# Patient Record
Sex: FEMALE | Race: BLACK OR AFRICAN AMERICAN
[De-identification: names, ages, dates, MRNs, and addresses within clinical notes are randomized per-mention and may not be internally consistent; named-entity substitution may affect disease eponyms.]

---

## 2023-04-17 PROBLEM — Z00.00 ENCOUNTER FOR PREVENTIVE HEALTH EXAMINATION: Status: ACTIVE | Noted: 2023-04-17

## 2023-04-20 ENCOUNTER — TRANSCRIPTION ENCOUNTER (OUTPATIENT)
Age: 28
End: 2023-04-20

## 2023-04-20 ENCOUNTER — APPOINTMENT (OUTPATIENT)
Dept: SURGERY | Facility: CLINIC | Age: 28
End: 2023-04-20
Payer: MEDICAID

## 2023-04-20 ENCOUNTER — LABORATORY RESULT (OUTPATIENT)
Age: 28
End: 2023-04-20

## 2023-04-20 VITALS — HEIGHT: 59 IN | WEIGHT: 95 LBS | BODY MASS INDEX: 19.15 KG/M2

## 2023-04-20 DIAGNOSIS — Z78.9 OTHER SPECIFIED HEALTH STATUS: ICD-10-CM

## 2023-04-20 DIAGNOSIS — E04.9 NONTOXIC GOITER, UNSPECIFIED: ICD-10-CM

## 2023-04-20 DIAGNOSIS — E04.2 NONTOXIC MULTINODULAR GOITER: ICD-10-CM

## 2023-04-20 PROCEDURE — 99203 OFFICE O/P NEW LOW 30 MIN: CPT | Mod: 25

## 2023-04-20 PROCEDURE — 10005 FNA BX W/US GDN 1ST LES: CPT

## 2023-05-02 ENCOUNTER — NON-APPOINTMENT (OUTPATIENT)
Age: 28
End: 2023-05-02

## 2023-05-02 PROBLEM — Z78.9 NO PERTINENT PAST MEDICAL HISTORY: Status: RESOLVED | Noted: 2023-05-02 | Resolved: 2023-05-02

## 2023-05-02 PROBLEM — Z78.9 NON-SMOKER: Status: ACTIVE | Noted: 2023-05-02

## 2023-05-02 PROBLEM — Z78.9 SOCIAL ALCOHOL USE: Status: ACTIVE | Noted: 2023-05-02

## 2023-05-02 NOTE — PHYSICAL EXAM
[de-identified] : no cervical or supraclavicular adenopathy, trachea midline, thyroid without enlargement with right 3 cm palpable mass [Normal] : no neck adenopathy [de-identified] : Skin:  normal appearance.  no rash, nodules, vesicles, or erythema,\par Musculoskeletal:  full range of motion and no deformities appreciated\par Neurological:  grossly intact\par Psychiatric:  oriented to person, place and time with appropriate affect

## 2023-05-02 NOTE — PHYSICAL EXAM
[de-identified] : no cervical or supraclavicular adenopathy, trachea midline, thyroid without enlargement with right 3 cm palpable mass [Normal] : no neck adenopathy [de-identified] : Skin:  normal appearance.  no rash, nodules, vesicles, or erythema,\par Musculoskeletal:  full range of motion and no deformities appreciated\par Neurological:  grossly intact\par Psychiatric:  oriented to person, place and time with appropriate affect

## 2023-05-02 NOTE — ASSESSMENT
[FreeTextEntry1] : Patient with recently noted right neck mass corresponding to thyroid nodule.  Ultrasound-guided fine-needle aspiration performed in the office today.  Please see separate procedure note.  If cytology is benign, I have recommended a repeat ultrasound September 2023.  The patient will contact my office to review results.  RTO 6 months.  I have answered their questions to the best of my ability.\par

## 2023-05-02 NOTE — PROCEDURE
[FreeTextEntry1] : Ultrasound-guided thyroid FNA [FreeTextEntry2] : Right thyroid nodule 3 cm [FreeTextEntry3] : Patient for thyroid biopsy. Risks benefits and alternatives discussed including bleeding, infection, swelling and need for repeat biopsy. Questions answered.\par Consent obtained, timeout taken.\par \par Patient supine.\par No anesthetic used. \par Nodule localized with US guidance\par Sterile technique with Betadine skin prep.\par 22-gauge needle under ultrasound guidance with a single pass.\par Slides prepared sent to cytology.\par \par Post procedure:\par Hemostasis obtained, patient tolerated well, no complications.\par Post procedure instructions given.\par

## 2023-05-02 NOTE — CONSULT LETTER
[Dear  ___] : Dear  [unfilled], [Consult Letter:] : I had the pleasure of evaluating your patient, [unfilled]. [Please see my note below.] : Please see my note below. [Consult Closing:] : Thank you very much for allowing me to participate in the care of this patient.  If you have any questions, please do not hesitate to contact me. [Sincerely,] : Sincerely, [FreeTextEntry2] : Dr. Tanya Howard [FreeTextEntry3] : Shelby Godfrey MD, FACS\par Assistant Professor of Surgery and Otolaryngology\par [facility]\par

## 2023-05-02 NOTE — CONSULT LETTER
[Dear  ___] : Dear  [unfilled], [Consult Letter:] : I had the pleasure of evaluating your patient, [unfilled]. [Please see my note below.] : Please see my note below. [Consult Closing:] : Thank you very much for allowing me to participate in the care of this patient.  If you have any questions, please do not hesitate to contact me. [Sincerely,] : Sincerely, [FreeTextEntry2] : Dr. Tanya Howard [FreeTextEntry3] : Shelby Godfrey MD, FACS\par Assistant Professor of Surgery and Otolaryngology\par [facility]\par

## 2023-05-02 NOTE — HISTORY OF PRESENT ILLNESS
[de-identified] : Patient referred by Dr. Howard for evaluation of right neck mass.  During recent physical examination follow-up 2022, was noted to have a neck mass.  Thyroid neck ultrasound February 2023: Right lobe 5.8 x 2.2 x 3.1 cm with mid complex nodule measuring 3 x 2.1 x 2.5 cm, additional lower nodule 1.9 x 1.4 x 1.7 cm.  Left lobe 4.7 x 1.7 x 1.5 cm with mid nodule 1.3 x 0.7 x 0.9 cm.  No enlarged lymph nodes.  Thyroid peroxidase antibodies 587.  Patient denies prior history of thyroid disease, dysphagia, change in voice or radiation exposure.  I have reviewed all old and new data and available images.  
[de-identified] : Patient referred by Dr. Howard for evaluation of right neck mass.  During recent physical examination follow-up 2022, was noted to have a neck mass.  Thyroid neck ultrasound February 2023: Right lobe 5.8 x 2.2 x 3.1 cm with mid complex nodule measuring 3 x 2.1 x 2.5 cm, additional lower nodule 1.9 x 1.4 x 1.7 cm.  Left lobe 4.7 x 1.7 x 1.5 cm with mid nodule 1.3 x 0.7 x 0.9 cm.  No enlarged lymph nodes.  Thyroid peroxidase antibodies 587.  Patient denies prior history of thyroid disease, dysphagia, change in voice or radiation exposure.  I have reviewed all old and new data and available images.  
No

## 2023-05-02 NOTE — REASON FOR VISIT
[Initial Consultation] : an initial consultation for [FreeTextEntry2] : Neck mass [Procedure: _________] : a [unfilled] procedure visit [Other: _____] : [unfilled]

## 2023-10-26 ENCOUNTER — APPOINTMENT (OUTPATIENT)
Dept: SURGERY | Facility: CLINIC | Age: 28
End: 2023-10-26